# Patient Record
Sex: FEMALE | Race: WHITE
[De-identification: names, ages, dates, MRNs, and addresses within clinical notes are randomized per-mention and may not be internally consistent; named-entity substitution may affect disease eponyms.]

---

## 2020-07-29 ENCOUNTER — HOSPITAL ENCOUNTER (EMERGENCY)
Dept: HOSPITAL 74 - FER | Age: 70
Discharge: HOME | End: 2020-07-29
Payer: COMMERCIAL

## 2020-07-29 VITALS — DIASTOLIC BLOOD PRESSURE: 81 MMHG | SYSTOLIC BLOOD PRESSURE: 128 MMHG | TEMPERATURE: 98.7 F | HEART RATE: 91 BPM

## 2020-07-29 VITALS — BODY MASS INDEX: 21.7 KG/M2

## 2020-07-29 DIAGNOSIS — S09.90XA: Primary | ICD-10-CM

## 2020-07-29 PROCEDURE — 3E0234Z INTRODUCTION OF SERUM, TOXOID AND VACCINE INTO MUSCLE, PERCUTANEOUS APPROACH: ICD-10-PCS | Performed by: STUDENT IN AN ORGANIZED HEALTH CARE EDUCATION/TRAINING PROGRAM

## 2020-07-29 NOTE — PDOC
History of Present Illness





- General


Chief Complaint: Injury


Stated Complaint: FALL ON SUNDAY


Time Seen by Provider: 07/29/20 12:01


History Source: Patient


Exam Limitations: No Limitations





- History of Present Illness


Initial Comments: 





07/29/20 12:13


71 yo F h/o vertigo p/w fall on sunday. Patient doesn't think there was anything

on the floor that she tripped over. DEnies CP, SOB, or worsening dizziness prior

to the fall or afterwards. States she has vertigo at baseline and is taking 

meclizine. Is usually ambulatory with a cane which helps her feel more stable on

her feet. She's not sure if she was using her cane to walk on sunday (unable to 

recall) because she was only walking a short distance of ~5-6 feet. Of note, 

family reports that patient was drinking on sunday (as per family patient drinks

many days). Denies LOC. Takes asa but otherwise no a/c use. Reports bleeding 

from her scalp at the time, none now. Only came to the ED today because her 

daughter came over and saw the dried blood on the floor from the fall and 

insisted her mother come the ED. Patient has no complaints at this time. Last 

tetanus unknown. 











Past History





- Medical History


Allergies/Adverse Reactions: 


                                    Allergies











Allergy/AdvReac Type Severity Reaction Status Date / Time


 


No Known Allergies Allergy   Verified 07/29/20 11:57











Home Medications: 


Ambulatory Orders





Aspirin [ASA -] 81 mg PO DAILY 07/29/20 


Atorvastatin Ca [Lipitor] 10 mg PO HS 07/29/20 


Meclizine HCl 25 mg PO HS 07/29/20 


Metformin HCl [Glucophage] 500 mg PO DAILY 07/29/20 


Pramipexole Di-HCl [Mirapex] 2 mg PO BID 07/29/20 


Sertraline HCl 50 mg PO DAILY 07/29/20 











**Review of Systems





- Review of Systems


Able to Perform ROS?: Yes


Comments:: 





07/29/20 12:16


GENERAL/CONSTITUTIONAL: No fever or chills. No weakness.





HEAD, EYES, EARS, NOSE AND THROAT: No change in vision. No ear pain or 

discharge. No sore throat.





CARDIOVASCULAR: No chest pain or shortness of breath.





RESPIRATORY: No cough, wheezing, or hemoptysis.





GASTROINTESTINAL: No nausea, vomiting, diarrhea or constipation.





GENITOURINARY: No dysuria, frequency, or change in urination.





MUSCULOSKELETAL: No joint or muscle swelling or pain. No neck or back pain.





SKIN: No rash.





NEUROLOGIC: No headache, loss of consciousness, or change in strength/sensation.

 +vertigo unchanged from baseline





ENDOCRINE: No increased thirst. No abnormal weight change.





HEMATOLOGIC/LYMPHATIC: No anemia, easy bleeding, or history of blood clots.





ALLERGIC/IMMUNOLOGIC: No hives or skin allergy.





*Physical Exam





- Physical Exam





07/29/20 12:16


GENERAL: Well appearing, in no acute distress





HEAD: + hematoma to superior occiput with dried blood but no active bleeding, no

 racoon eyes, no walsh sign





EYES: EOMI, sclera anicteric, conjunctiva clear, no nystagmus





ENT: Auricles normal inspection, nares patent, oropharynx clear without 

exudates. MMM





NECK: Normal ROM, supple





LUNGS: CTAB. Good air entry.  No wheezes, No Rhonchi and no crackles





HEART: RRR, + s1 s2





ABDOMEN: Soft, nontender, normoactive bowel sounds.  No guarding, no rebound.  

No masses





BACK: no midline or paraspinal tenderness. No CVA tenderness.





EXTREMITIES: Warm and well perfused. No LE edema. FROM.  No clubbing or 

cyanosis. No cords, erythema, or tenderness





NEUROLOGICAL: Aox3, Speech fluent, face symmetric, tongue/uvula midline. 

Sensation grossly intact to light touch. Ambulatory with steady gait with cane. 

Strength intact. Finger to nose intact and symmetric b/l. No focal deficits.











ED Treatment Course





- RADIOLOGY


Radiology Studies Ordered: 














 Category Date Time Status


 


 CERVICAL SPINE CT W/O CONTR [CT] Stat CT Scan  07/29/20 12:11 Ordered


 


 HEAD CT WITHOUT CONTRAST [CT] Stat CT Scan  07/29/20 12:11 Ordered














Medical Decision Making





- Medical Decision Making





07/29/20 12:18


71 yo F with fall on sunday likely 2/2 unsteady gait from vertigo vs. etoh 

intox, +hematoma to scalp but no active bleeding and no other injuries or 

complaints. Doubt syncopal episode as patient reports no LOC and no preceding 

symptoms and hx and exam more consistent with fall 2/2 etoh intox vs. vertigo. 





Plan:


-CT head


-CT c-spine


-tetanus


-reassess





This clinical encounter is taking place during a federal and state health care 

emergency attributable to the novel Corona Virus pandemic.


The [place] of the Department of Health and Human Services has declared, 

pursuant to the Public Health Service Act  319F-3 (42 U.S[place]C.  247d-6d), that a

 covered persons activities related to medical countermeasures against COVID-19

 will be immune from liability under Federal and State law.


07/29/20 13:35


imaging without any acute findings. Will d/c with return precautions, recommend 

PMD f/u as needed





Discharge





- Discharge Information


Problems reviewed: Yes


Clinical Impression/Diagnosis: 


Fall


Qualifiers:


 Encounter type: initial encounter Qualified Code(s): W19.XXXA - Unspecified 

fall, initial encounter





Condition: Stable


Disposition: HOME





- Admission


No





- Follow up/Referral





- Patient Discharge Instructions


Patient Printed Discharge Instructions:  How to Prevent Falls


Additional Instructions: 


Your CT scan did not show any evidence of bleeding in the brain. Return to the 

ED for new or worsening symptoms. You should follow up with your PMD. 





- Post Discharge Activity

## 2020-09-05 ENCOUNTER — HOSPITAL ENCOUNTER (EMERGENCY)
Dept: HOSPITAL 74 - JER | Age: 70
Discharge: HOME | End: 2020-09-05
Payer: COMMERCIAL

## 2020-09-05 VITALS — TEMPERATURE: 98.3 F | HEART RATE: 84 BPM | DIASTOLIC BLOOD PRESSURE: 80 MMHG | SYSTOLIC BLOOD PRESSURE: 138 MMHG

## 2020-09-05 VITALS — BODY MASS INDEX: 20.9 KG/M2

## 2020-09-05 DIAGNOSIS — T50.995A: Primary | ICD-10-CM

## 2020-09-05 NOTE — PDOC
History of Present Illness





- General


Chief Complaint: Head/Neck problem


Stated Complaint: ALLREGIC REACTION


Time Seen by Provider: 09/05/20 15:53





- History of Present Illness


Initial Comments: 





09/05/20 17:47


70F with PMH of DM and normal pressure hydrocephalus, peripheral neuropathy, DM,

and TIAs presents to the ED with complaint of headache with nausea/vomiting last

night of 1 episode, but no current nausea or vomiting. The patient was seen by 

her neurologist Dr. Bro recently, started on Aggrenox yesterday after 

pontine infarct. The patient is concerned that her n/v, headache and neck pain 

are due to the new medication. The patient reports baseline vertigo that is 

unchanged. She reports LE numbness/tingling that's worse from baseline. 

Otherwise, denies vision changes, recent falls/trauma, weakness chest pain, SOB.





PMH: as in HPI


SH: see below


Meds: aggrenox, metformin, meclizine 


Allergies: NKDA


Tob/Etoh/Rec drugs: neg x3





ROS


GENERAL/CONSTITUTIONAL: No fever or chills. No weakness.


HEENT: No change in vision. No ear pain or discharge. No sore throat.


CARDIOVASCULAR: No chest pain or shortness of breath


RESPIRATORY: No cough, wheezing, or hemoptysis.


GASTROINTESTINAL: No nausea, vomiting; no diarrhea or constipation.


GENITOURINARY: No dysuria, frequency, or change in urination.


MUSCULOSKELETAL: No joint or muscle swelling or pain. +neck pain (right)


SKIN: No rash


NEUROLOGIC: + headache, vertigo; no loss of consciousness, or change in 

strength/sensation.


ENDOCRINE: No increased thirst. No abnormal weight change


HEMATOLOGIC/LYMPHATIC: No anemia, easy bleeding, or history of blood clots.


ALLERGIC/IMMUNOLOGIC: No hives or skin allergy.





PE


GENERAL: Awake, alert, and fully oriented; no acute distress


HEAD: No signs of trauma, normocephalic, atraumatic 


EYES: PERRLA, EOMI, sclera anicteric, conjunctiva clear


ENT: Auricles normal inspection, hearing grossly normal, nares patent, moist 

mucosa, oropharynx clear without exudates.


NECK: Normal ROM, supple, no LAD, JVD, or masses


HEART: Regular rate and rhythm, normal S1/S2, no murmurs, rubs or gallops, 

peripheral pulses normal and equal bilaterally. 


LUNGS: No distress, speaks full sentences, clear to auscultation bilaterally


ABDOMEN: Soft, nontender. No guarding, no rebound. No masses


EXTREMITIES: Normal inspection, Normal range of motion, no edema. No clubbing or

cyanosis. 


NEUROLOGICAL: CNII-XII grossly intact. Normal speech, no focal sensorimotor 

deficits, finger-to-nose intact


SKIN: Warm, Dry, normal turgor, no rashes or lesions noted





Assessment and Plan


1. TIA


2. Normal pressure hydrocephalus


3. Tension HA


4. Vertebral dissection








Mendel Nugent, PGY1


Emergency Medicine











Past History





- Medical History


Allergies/Adverse Reactions: 


                                    Allergies











Allergy/AdvReac Type Severity Reaction Status Date / Time


 


No Known Allergies Allergy   Verified 09/05/20 15:12











Home Medications: 


Ambulatory Orders





Atorvastatin Ca [Lipitor] 10 mg PO HS 07/29/20 


Meclizine HCl 25 mg PO HS 07/29/20 


Metformin HCl [Glucophage] 500 mg PO DAILY 07/29/20 


Aspirin [ASA -] 81 mg PO DAILY #30 tab.chew 09/05/20 


Aspirin/Dipyridamole [Aggrenox -] mg PO BID 09/05/20 


Clopidogrel Bisulfate [Plavix] 75 mg PO DAILY #30 tablet 09/05/20 








COPD: No





- Surgical History


Appendectomy: Yes





- Psycho-Social/Smoking History


Smoking History: Never smoked


Have you smoked in the past 12 months: No





- Substance Abuse Hx (Audit-C & DAST Scrn)


How often the patient has a drink containing alcohol: Never


Score: In Men: 4 or > Positive; In Women: 3 or > Positive: 0


Screen Result (Pos requires Nsg. Audit-10AR): Negative


In the last yr the pt used illegal drug/Rx for NonMed reason: No


Score:  Yes response is considered Positive: 0


Screen Result (Positive result requires Nsg. DAST-10): Negative





*Physical Exam





- Vital Signs


                                Last Vital Signs











Temp Pulse Resp BP Pulse Ox


 


 97.4 F L  100 H  18   114/74   96 


 


 09/05/20 15:12  09/05/20 15:12  09/05/20 15:12  09/05/20 15:12  09/05/20 15:12














Medical Decision Making





- Medical Decision Making





09/05/20 18:43





70F with PMH of DM and normal pressure hydrocephalus, peripheral neuropathy, DM,

 and TIAs presents to the ED with complaint of headache with nausea/vomiting 

last night of 1 episode, but no current nausea or vomiting. The patient was seen

 by her neurologist Dr. Bro recently, started on Aggrenox yesterday after 

pontine infarct. The patient is concerned that her n/v, headache and neck pain 

are due to the new medication. The patient reports baseline vertigo that is 

unchanged. She reports LE numbness/tingling that's worse from baseline. 

Otherwise, denies vision changes, recent falls/trauma, weakness chest pain, SOB.





Neuro exam intact.





No workup appears necessary at this time.





Spoke with Dr. Best, advised switching aggrenox to ASA + plavix and the 

patient was scheduled to follow up with him on Tuesday 9/8. 





Patient stable for discharge. 











Discharge





- Discharge Information


Problems reviewed: Yes


Clinical Impression/Diagnosis: 


 Medication side effect





Condition: Stable


Disposition: HOME





- Admission


No





- Additional Discharge Information


Prescriptions: 


Aspirin [ASA -] 81 mg PO DAILY #30 tab.chew


Clopidogrel Bisulfate [Plavix] 75 mg PO DAILY #30 tablet





- Follow up/Referral


Referrals: 


Antonio Reza [Primary Care Provider] - 


Tyshawn Rose MD [Staff Physician] - 





- Patient Discharge Instructions


Additional Instructions: 


Return to the emergency department immediately with ANY new, persistent or 

worsening symptoms.





Stop taking the Aggrenox, start taking the Plavix and aspirin tomorrow. 





You MUST call and follow up with  on Tuesday for further evaluation of

your symptoms. Results were discussed with you. Please make sure your doctor 

reviews the results of your emergency evaluation. Your Emergency Department 

visit is not complete without a follow up with your doctor.





Print Language: ENGLISH





- Post Discharge Activity

## 2020-09-05 NOTE — PDOC
Documentation entered by Liliam Lewis SCRIBE, acting as scribe for Valdemar West MD.








Valdemar West MD:  This documentation has been prepared by the scribe, Liliam Lewis SCRIBE, under my direction and personally reviewed by me in its entirety.  I 

confirm that the documentation accurately reflects all work, treatment, 

procedures, and medical decision making performed by me.  





Attending Attestation





- Resident


Resident Name: Mendel Nugent





- ED Attending Attestation


I have performed the following: I have examined & evaluated the patient, The 

case was reviewed & discussed with the resident, I agree w/resident's findings &

plan, Exceptions are as noted





- HPI


HPI: 





09/05/20 16:59


Patient is a 70 year old female with a significant past medical history of 

peripheral neuropathy, vertigo, diabetes, TIAs, and normal pressure h

ydrocephalus, who presents to the ED with a headache and associated nausea/ 

vomiting since last night. Patient stated she had one episode of NBNB vomiting 

last night. Patient disclosed that she believes her symptoms are associated with

the new medication she has been put on recently by her neurologist. 





Patient endorses: lower extremity tingling/numbness.


Patient denies: current nausea or vomiting; recent falls/traumas, weakness, any 

vision changes, SOB, chest pain, or any other related symptoms. 





Allergies: NKDA














- Physicial Exam


PE: 





09/05/20 18:37


GENERAL: The patient is awake, alert, and fully oriented, Nontoxic - in no acute

distress.


HEAD: Normocephalic, atraumatic.


LUNGS: Breath sounds equal, clear to auscultation bilaterally.  No wheezes, no 

rhonchi, no rales.


HEART: Regular rate and rhythm, normal S1 and S2 without murmur, rub or gallop.


ABDOMEN: Soft, nontender, No guarding, no rebound.  No CVA tenderness


EXTREMITIES: Normal range of motion, no edema.  


NEUROLOGICAL: No facial assymetry, Normal speech, movng all 4 ext spontaneously 

and symmetrically 








- Medical Decision Making





09/05/20 18:37


side effect from meds


will change to asa / plavix


will dc with neuro fu


return precautions were discussed











Discharge





- Discharge Information


Problems reviewed: Yes


Clinical Impression/Diagnosis: 


 Medication side effect





Condition: Stable


Disposition: HOME





- Admission


No





- Follow up/Referral


Referrals: 


Antonio Reza [Primary Care Provider] - 


Tyshawn Rose MD [Staff Physician] - 





- Patient Discharge Instructions


Additional Instructions: 


Return to the emergency department immediately with ANY new, persistent or 

worsening symptoms.





Stop taking the Aggrenox, start taking the Plavix and aspirin tomorrow. 





You MUST call and follow up with  on Tuesday for further evaluation of

your symptoms. Results were discussed with you. Please make sure your doctor 

reviews the results of your emergency evaluation. Your Emergency Department 

visit is not complete without a follow up with your doctor.





Print Language: ENGLISH





- Post Discharge Activity

## 2020-09-05 NOTE — PDOC
Attending Attestation





- Resident


Resident Name: Mendel Nugent





- ED Attending Attestation


I have performed the following: I have examined & evaluated the patient, The 

case was reviewed & discussed with the resident, I agree w/resident's findings &

plan, Exceptions are as noted





- HPI


HPI: 





09/05/20 18:36


70y F hx of recently diagnosed cva














Discharge





- Follow up/Referral


Referrals: 


Antonio Reza [Primary Care Provider] - 





- Patient Discharge Instructions





- Post Discharge Activity

## 2021-04-26 ENCOUNTER — HOSPITAL ENCOUNTER (EMERGENCY)
Dept: HOSPITAL 74 - JER | Age: 71
Discharge: HOME | End: 2021-04-26
Payer: COMMERCIAL

## 2021-04-26 VITALS — HEART RATE: 94 BPM | TEMPERATURE: 98.2 F | DIASTOLIC BLOOD PRESSURE: 90 MMHG | SYSTOLIC BLOOD PRESSURE: 153 MMHG

## 2021-04-26 VITALS — BODY MASS INDEX: 26.9 KG/M2

## 2021-04-26 DIAGNOSIS — S46.912A: Primary | ICD-10-CM

## 2021-12-17 ENCOUNTER — HOSPITAL ENCOUNTER (INPATIENT)
Dept: HOSPITAL 74 - JER | Age: 71
LOS: 1 days | Discharge: HOME | DRG: 349 | End: 2021-12-18
Attending: INTERNAL MEDICINE | Admitting: INTERNAL MEDICINE
Payer: COMMERCIAL

## 2021-12-17 VITALS — BODY MASS INDEX: 25.7 KG/M2

## 2021-12-17 DIAGNOSIS — E11.39: ICD-10-CM

## 2021-12-17 DIAGNOSIS — K61.0: Primary | ICD-10-CM

## 2021-12-17 DIAGNOSIS — H40.9: ICD-10-CM

## 2021-12-17 LAB
ALBUMIN SERPL-MCNC: 3.1 G/DL (ref 3.4–5)
ALP SERPL-CCNC: 66 U/L (ref 45–117)
ALT SERPL-CCNC: 23 U/L (ref 13–61)
ANION GAP SERPL CALC-SCNC: 8 MMOL/L (ref 8–16)
APTT BLD: 31.5 SECONDS (ref 25.2–36.5)
AST SERPL-CCNC: 18 U/L (ref 15–37)
BASOPHILS # BLD: 0.3 % (ref 0–2)
BILIRUB SERPL-MCNC: 0.7 MG/DL (ref 0.2–1)
BUN SERPL-MCNC: 14.8 MG/DL (ref 7–18)
CALCIUM SERPL-MCNC: 8.6 MG/DL (ref 8.5–10.1)
CHLORIDE SERPL-SCNC: 104 MMOL/L (ref 98–107)
CO2 SERPL-SCNC: 27 MMOL/L (ref 21–32)
CREAT SERPL-MCNC: 0.5 MG/DL (ref 0.55–1.3)
DEPRECATED RDW RBC AUTO: 12.5 % (ref 11.6–15.6)
EOSINOPHIL # BLD: 1.1 % (ref 0–4.5)
GLUCOSE SERPL-MCNC: 190 MG/DL (ref 74–106)
HCT VFR BLD CALC: 38.3 % (ref 32.4–45.2)
HGB BLD-MCNC: 13.4 GM/DL (ref 10.7–15.3)
INR BLD: 1.16 (ref 0.83–1.09)
LYMPHOCYTES # BLD: 10.6 % (ref 8–40)
MCH RBC QN AUTO: 33.7 PG (ref 25.7–33.7)
MCHC RBC AUTO-ENTMCNC: 34.9 G/DL (ref 32–36)
MCV RBC: 96.6 FL (ref 80–96)
MONOCYTES # BLD AUTO: 10.4 % (ref 3.8–10.2)
NEUTROPHILS # BLD: 77.6 % (ref 42.8–82.8)
PLATELET # BLD AUTO: 208 10^3/UL (ref 134–434)
PMV BLD: 8.1 FL (ref 7.5–11.1)
PROT SERPL-MCNC: 6.6 G/DL (ref 6.4–8.2)
PT PNL PPP: 13 SEC (ref 9.7–13)
RBC # BLD AUTO: 3.97 M/MM3 (ref 3.6–5.2)
SODIUM SERPL-SCNC: 139 MMOL/L (ref 136–145)
WBC # BLD AUTO: 8.4 K/MM3 (ref 4–10)

## 2021-12-17 PROCEDURE — 0JBB0ZZ EXCISION OF PERINEUM SUBCUTANEOUS TISSUE AND FASCIA, OPEN APPROACH: ICD-10-PCS | Performed by: SURGERY

## 2021-12-17 PROCEDURE — U0003 INFECTIOUS AGENT DETECTION BY NUCLEIC ACID (DNA OR RNA); SEVERE ACUTE RESPIRATORY SYNDROME CORONAVIRUS 2 (SARS-COV-2) (CORONAVIRUS DISEASE [COVID-19]), AMPLIFIED PROBE TECHNIQUE, MAKING USE OF HIGH THROUGHPUT TECHNOLOGIES AS DESCRIBED BY CMS-2020-01-R: HCPCS

## 2021-12-17 PROCEDURE — U0005 INFEC AGEN DETEC AMPLI PROBE: HCPCS

## 2021-12-17 PROCEDURE — 0D9Q0ZZ DRAINAGE OF ANUS, OPEN APPROACH: ICD-10-PCS | Performed by: SURGERY

## 2021-12-17 PROCEDURE — C9803 HOPD COVID-19 SPEC COLLECT: HCPCS

## 2021-12-18 VITALS — SYSTOLIC BLOOD PRESSURE: 106 MMHG | TEMPERATURE: 98.2 F | DIASTOLIC BLOOD PRESSURE: 69 MMHG | HEART RATE: 82 BPM
